# Patient Record
Sex: FEMALE | Race: BLACK OR AFRICAN AMERICAN | Employment: OTHER | URBAN - METROPOLITAN AREA
[De-identification: names, ages, dates, MRNs, and addresses within clinical notes are randomized per-mention and may not be internally consistent; named-entity substitution may affect disease eponyms.]

---

## 2022-04-10 ENCOUNTER — APPOINTMENT (OUTPATIENT)
Dept: CT IMAGING | Age: 87
DRG: 684 | End: 2022-04-10
Attending: EMERGENCY MEDICINE
Payer: MEDICARE

## 2022-04-10 ENCOUNTER — HOSPITAL ENCOUNTER (EMERGENCY)
Age: 87
Discharge: STILL ADMITTED OR ADMINISTRATIVE | DRG: 684 | End: 2022-04-10
Attending: EMERGENCY MEDICINE
Payer: MEDICARE

## 2022-04-10 ENCOUNTER — HOSPITAL ENCOUNTER (INPATIENT)
Age: 87
LOS: 1 days | Discharge: HOME | DRG: 684 | End: 2022-04-11
Attending: FAMILY MEDICINE | Admitting: FAMILY MEDICINE
Payer: MEDICARE

## 2022-04-10 VITALS
DIASTOLIC BLOOD PRESSURE: 63 MMHG | TEMPERATURE: 97.8 F | SYSTOLIC BLOOD PRESSURE: 136 MMHG | WEIGHT: 198.41 LBS | HEART RATE: 62 BPM | OXYGEN SATURATION: 98 % | BODY MASS INDEX: 33.06 KG/M2 | RESPIRATION RATE: 16 BRPM | HEIGHT: 65 IN

## 2022-04-10 DIAGNOSIS — R55 SYNCOPE, UNSPECIFIED SYNCOPE TYPE: Primary | ICD-10-CM

## 2022-04-10 PROBLEM — N17.9 AKI (ACUTE KIDNEY INJURY) (HCC): Status: ACTIVE | Noted: 2022-04-10

## 2022-04-10 PROBLEM — R19.7 DIARRHEA: Status: ACTIVE | Noted: 2022-04-10

## 2022-04-10 PROBLEM — R11.10 VOMITING: Status: ACTIVE | Noted: 2022-04-10

## 2022-04-10 LAB
ALBUMIN SERPL-MCNC: 3.8 G/DL (ref 3.2–4.6)
ALBUMIN/GLOB SERPL: 1.4 {RATIO}
ALP SERPL-CCNC: 66 U/L (ref 45–117)
ALT SERPL-CCNC: 9 U/L (ref 13–61)
ANION GAP SERPL CALC-SCNC: 12 MMOL/L (ref 7–16)
APPEARANCE UR: CLEAR
AST SERPL-CCNC: 22 U/L (ref 15–37)
BASOPHILS # BLD: 0 K/UL (ref 0–0.2)
BASOPHILS NFR BLD: 0 % (ref 0–2)
BILIRUB SERPL-MCNC: 0.2 MG/DL (ref 0.2–1.1)
BILIRUB UR QL: NEGATIVE
BUN SERPL-MCNC: 17 MG/DL (ref 7–18)
CALCIUM SERPL-MCNC: 8.7 MG/DL (ref 8.3–10.4)
CHLORIDE SERPL-SCNC: 103 MMOL/L (ref 98–107)
CO2 SERPL-SCNC: 23 MMOL/L (ref 21–32)
COLOR UR: YELLOW
CREAT SERPL-MCNC: 1.25 MG/DL (ref 0.6–1)
DIFFERENTIAL METHOD BLD: ABNORMAL
EOSINOPHIL # BLD: 0 K/UL (ref 0–0.8)
EOSINOPHIL NFR BLD: 0 % (ref 0.5–7.8)
ERYTHROCYTE [DISTWIDTH] IN BLOOD BY AUTOMATED COUNT: 14.5 % (ref 11.9–14.6)
GLOBULIN SER CALC-MCNC: 2.8 G/DL (ref 2.3–3.5)
GLUCOSE SERPL-MCNC: 129 MG/DL (ref 65–100)
GLUCOSE UR STRIP.AUTO-MCNC: NEGATIVE MG/DL
HCT VFR BLD AUTO: 36.6 % (ref 35.8–46.3)
HGB BLD-MCNC: 12 G/DL (ref 11.7–15.4)
HGB UR QL STRIP: NEGATIVE
IMM GRANULOCYTES # BLD AUTO: 0 K/UL (ref 0–0.5)
IMM GRANULOCYTES NFR BLD AUTO: 0 % (ref 0–5)
KETONES UR QL STRIP.AUTO: NEGATIVE MG/DL
LACTATE SERPL-SCNC: 1.1 MMOL/L (ref 0.4–2)
LEUKOCYTE ESTERASE UR QL STRIP.AUTO: NEGATIVE
LYMPHOCYTES # BLD: 1 K/UL (ref 0.5–4.6)
LYMPHOCYTES NFR BLD: 14 % (ref 13–44)
MAGNESIUM SERPL-MCNC: 1.8 MG/DL (ref 1.2–2.6)
MCH RBC QN AUTO: 28.6 PG (ref 26.1–32.9)
MCHC RBC AUTO-ENTMCNC: 32.8 G/DL (ref 31.4–35)
MCV RBC AUTO: 87.4 FL (ref 79.6–97.8)
MONOCYTES # BLD: 0.6 K/UL (ref 0.1–1.3)
MONOCYTES NFR BLD: 8 % (ref 4–12)
NEUTS SEG # BLD: 5.3 K/UL (ref 1.7–8.2)
NEUTS SEG NFR BLD: 77 % (ref 43–78)
NITRITE UR QL STRIP.AUTO: NEGATIVE
NRBC # BLD: 0 K/UL (ref 0–0.2)
PH UR STRIP: 5 [PH] (ref 5–9)
PLATELET # BLD AUTO: 162 K/UL (ref 150–450)
PMV BLD AUTO: 9.8 FL (ref 9.4–12.3)
POTASSIUM SERPL-SCNC: 4 MMOL/L (ref 3.5–5.1)
PROT SERPL-MCNC: 6.6 G/DL (ref 6.4–8.2)
PROT UR STRIP-MCNC: NEGATIVE MG/DL
RBC # BLD AUTO: 4.19 M/UL (ref 4.05–5.2)
SODIUM SERPL-SCNC: 138 MMOL/L (ref 136–145)
SP GR UR REFRACTOMETRY: 1.02 (ref 1–1.02)
TROPONIN T SERPL HS-MCNC: 12.1 NG/L (ref 0–14)
UROBILINOGEN UR QL STRIP.AUTO: 0.2 EU/DL (ref 0.2–1)
WBC # BLD AUTO: 6.9 K/UL (ref 4.3–11.1)

## 2022-04-10 PROCEDURE — 85025 COMPLETE CBC W/AUTO DIFF WBC: CPT

## 2022-04-10 PROCEDURE — 81003 URINALYSIS AUTO W/O SCOPE: CPT

## 2022-04-10 PROCEDURE — 93005 ELECTROCARDIOGRAM TRACING: CPT | Performed by: EMERGENCY MEDICINE

## 2022-04-10 PROCEDURE — 99285 EMERGENCY DEPT VISIT HI MDM: CPT

## 2022-04-10 PROCEDURE — 70450 CT HEAD/BRAIN W/O DYE: CPT

## 2022-04-10 PROCEDURE — 83605 ASSAY OF LACTIC ACID: CPT

## 2022-04-10 PROCEDURE — 80053 COMPREHEN METABOLIC PANEL: CPT

## 2022-04-10 PROCEDURE — 84484 ASSAY OF TROPONIN QUANT: CPT

## 2022-04-10 PROCEDURE — 65270000029 HC RM PRIVATE

## 2022-04-10 PROCEDURE — 83735 ASSAY OF MAGNESIUM: CPT

## 2022-04-10 PROCEDURE — 74011250636 HC RX REV CODE- 250/636: Performed by: FAMILY MEDICINE

## 2022-04-10 PROCEDURE — 74011000250 HC RX REV CODE- 250: Performed by: FAMILY MEDICINE

## 2022-04-10 RX ORDER — ACETAMINOPHEN 650 MG/1
650 SUPPOSITORY RECTAL
Status: DISCONTINUED | OUTPATIENT
Start: 2022-04-10 | End: 2022-04-11 | Stop reason: HOSPADM

## 2022-04-10 RX ORDER — SODIUM CHLORIDE 0.9 % (FLUSH) 0.9 %
5-40 SYRINGE (ML) INJECTION EVERY 8 HOURS
Status: DISCONTINUED | OUTPATIENT
Start: 2022-04-10 | End: 2022-04-10 | Stop reason: HOSPADM

## 2022-04-10 RX ORDER — SODIUM CHLORIDE 0.9 % (FLUSH) 0.9 %
5-40 SYRINGE (ML) INJECTION AS NEEDED
Status: DISCONTINUED | OUTPATIENT
Start: 2022-04-10 | End: 2022-04-11 | Stop reason: HOSPADM

## 2022-04-10 RX ORDER — ONDANSETRON 2 MG/ML
4 INJECTION INTRAMUSCULAR; INTRAVENOUS
Status: DISCONTINUED | OUTPATIENT
Start: 2022-04-10 | End: 2022-04-11 | Stop reason: HOSPADM

## 2022-04-10 RX ORDER — SODIUM CHLORIDE 0.9 % (FLUSH) 0.9 %
5-40 SYRINGE (ML) INJECTION AS NEEDED
Status: DISCONTINUED | OUTPATIENT
Start: 2022-04-10 | End: 2022-04-10 | Stop reason: HOSPADM

## 2022-04-10 RX ORDER — SODIUM CHLORIDE 9 MG/ML
100 INJECTION, SOLUTION INTRAVENOUS CONTINUOUS
Status: DISCONTINUED | OUTPATIENT
Start: 2022-04-10 | End: 2022-04-11

## 2022-04-10 RX ORDER — ACETAMINOPHEN 325 MG/1
650 TABLET ORAL
Status: DISCONTINUED | OUTPATIENT
Start: 2022-04-10 | End: 2022-04-11 | Stop reason: HOSPADM

## 2022-04-10 RX ORDER — SODIUM CHLORIDE 9 MG/ML
125 INJECTION, SOLUTION INTRAVENOUS CONTINUOUS
Status: DISCONTINUED | OUTPATIENT
Start: 2022-04-10 | End: 2022-04-10 | Stop reason: HOSPADM

## 2022-04-10 RX ORDER — SODIUM CHLORIDE 0.9 % (FLUSH) 0.9 %
5-40 SYRINGE (ML) INJECTION EVERY 8 HOURS
Status: DISCONTINUED | OUTPATIENT
Start: 2022-04-10 | End: 2022-04-11 | Stop reason: HOSPADM

## 2022-04-10 RX ORDER — ENOXAPARIN SODIUM 100 MG/ML
40 INJECTION SUBCUTANEOUS EVERY 24 HOURS
Status: DISCONTINUED | OUTPATIENT
Start: 2022-04-10 | End: 2022-04-11 | Stop reason: HOSPADM

## 2022-04-10 RX ORDER — POLYETHYLENE GLYCOL 3350 17 G/17G
17 POWDER, FOR SOLUTION ORAL DAILY PRN
Status: DISCONTINUED | OUTPATIENT
Start: 2022-04-10 | End: 2022-04-11 | Stop reason: HOSPADM

## 2022-04-10 RX ORDER — ONDANSETRON 4 MG/1
4 TABLET, ORALLY DISINTEGRATING ORAL
Status: DISCONTINUED | OUTPATIENT
Start: 2022-04-10 | End: 2022-04-11 | Stop reason: HOSPADM

## 2022-04-10 RX ADMIN — SODIUM CHLORIDE 100 ML/HR: 9 INJECTION, SOLUTION INTRAVENOUS at 20:54

## 2022-04-10 RX ADMIN — SODIUM CHLORIDE, PRESERVATIVE FREE 10 ML: 5 INJECTION INTRAVENOUS at 20:54

## 2022-04-10 RX ADMIN — ENOXAPARIN SODIUM 40 MG: 40 INJECTION SUBCUTANEOUS at 22:02

## 2022-04-10 NOTE — H&P
Hospitalist History and Physical   Admit Date:  4/10/2022  6:23 PM   Name:  Ronak Hart   Age:  82 y.o. Sex:  female  :  1935   MRN:  [id number]     Presenting Complaint: syncope  Reason(s) for Admission: syncope     History of Present Illness:   Ronak Hart is a 82 y.o. female who presented to ED with a reported syncopal event. Patient has been sent here from the [facility] ER. She is accompanied by her daughter and son-in-law who she is here visiting from [place]. Patient tells me she feels fine and has no complaints at all. Daughter provides history:  Patient reportedly had a brief syncopal episode at lunch. She awoke spontaneously. After awaking, she had an episode of vomiting and diarrhea. She was reportedly weak afterwards. EMS arrived at some point in time afterwards and help the patient to the bathroom again, where she had another episode of diarrhea. She was then brought into the ER. The patient herself does not recall passing out or the diarrhea episodes. Upon ER workup, head CT with WNL. UA is not indicative of infection. Labs are okay besides mild IRAIDA with Cr of 1.25. Hospitalist asked to admit. Review of Systems:  10 systems reviewed and negative except as noted in HPI. Assessment & Plan:   * Syncope  - Potentially related to the volume depletion, patient does have mild IRAIDA (no known or reported h/o CKD)  - Head CT and other labs okay  - IVF  - Cardiac monitor    IRAIDA (acute kidney injury) ([facility] [address]  - Cr slightly elevated at 1.25  - No prior to compare  - IVFs  - Recheck in AM    Diarrhea  - Monitor for additional episodes  - Obtain stool sample if recurrent  - IVFs    Vomiting  - Zofran prn       Diet: ADULT DIET Regular  VTE ppx: lovenox  Code status: Full Code      Past medical history reviewed. Past surgical history reviewed.      No Known Allergies   Social History     Tobacco Use    Smoking status: Not on file    Smokeless tobacco: Not on file   Substance Use Topics    Alcohol use: Not on file       Family history reviewed and noncontributory to patient's acute condition; no relevant family history unless otherwise noted above. There is no immunization history on file for this patient. PTA Medications:  No current outpatient medications    Objective:     Patient Vitals for the past 24 hrs:   Temp Pulse Resp BP SpO2   04/10/22 1947 98.4 °F (36.9 °C) (!) 58 18 119/67 99 %   04/10/22 1854 97.7 °F (36.5 °C) 60 16 (!) 147/84 100 %     Oxygen Therapy  O2 Sat (%): 99 % (04/10/22 1947)    Estimated body mass index is 33.02 kg/m² as calculated from the following:    Height as of an earlier encounter on 4/10/22: 5' 5\" (1.651 m). Weight as of an earlier encounter on 4/10/22: 90 kg (198 lb 6.6 oz). No intake or output data in the 24 hours ending 04/10/22 1958      Physical Exam:  General:    Well nourished. No overt distress  Head:  Normocephalic, atraumatic  Eyes:  Sclerae appear normal.  Pupils equally round. HENT:  Nares appear normal, no drainage. Moist mucous membranes  Neck:  No restricted ROM. Trachea midline  CV:   RRR. S1/S2 auscultated  Lungs:   CTAB. No wheezing, rhonchi, or rales. Appears even, unlabored  Abdomen: Bowel sounds present. Soft, nontender, nondistended. Extremities: Warm and dry. No cyanosis or clubbing. No edema. Skin:     No rashes. Normal turgor. Normal coloration  Neuro:  Cranial nerves II-XII grossly intact. Sensation intact  Psych:  Normal mood and affect.   Alert and oriented x2-3    Data Ordered and Personally Reviewed:    Last 24hr Labs:  Recent Results (from the past 24 hour(s))   CBC WITH AUTOMATED DIFF    Collection Time: 04/10/22  3:01 PM   Result Value Ref Range    WBC 6.9 4.3 - 11.1 K/uL    RBC 4.19 4.05 - 5.20 M/uL    HGB 12.0 11.7 - 15.4 g/dL    HCT 36.6 35.8 - 46.3 %    MCV 87.4 79.6 - 97.8 FL    MCH 28.6 26.1 - 32.9 PG    MCHC 32.8 31.4 - 35.0 g/dL    RDW 14.5 11.9 - 14.6 %    PLATELET 102 064 - 218 K/uL MPV 9.8 9.4 - 12.3 FL    ABSOLUTE NRBC 0.00 0.0 - 0.2 K/uL    DF AUTOMATED      NEUTROPHILS 77 43 - 78 %    LYMPHOCYTES 14 13 - 44 %    MONOCYTES 8 4.0 - 12.0 %    EOSINOPHILS 0 (L) 0.5 - 7.8 %    BASOPHILS 0 0.0 - 2.0 %    IMMATURE GRANULOCYTES 0 0.0 - 5.0 %    ABS. NEUTROPHILS 5.3 1.7 - 8.2 K/UL    ABS. LYMPHOCYTES 1.0 0.5 - 4.6 K/UL    ABS. MONOCYTES 0.6 0.1 - 1.3 K/UL    ABS. EOSINOPHILS 0.0 0.0 - 0.8 K/UL    ABS. BASOPHILS 0.0 0.0 - 0.2 K/UL    ABS. IMM. GRANS. 0.0 0.0 - 0.5 K/UL   METABOLIC PANEL, COMPREHENSIVE    Collection Time: 04/10/22  3:01 PM   Result Value Ref Range    Sodium 138 136 - 145 mmol/L    Potassium 4.0 3.5 - 5.1 mmol/L    Chloride 103 98 - 107 mmol/L    CO2 23 21 - 32 mmol/L    Anion gap 12 7.0 - 16.0 mmol/L    Glucose 129 (H) 65 - 100 mg/dL    BUN 17 7.0 - 18.0 MG/DL    Creatinine 1.25 (H) 0.6 - 1.0 MG/DL    GFR est AA 52 (L) >60 ml/min/1.73m2    GFR est non-AA 43 (L) >60 ml/min/1.73m2    Calcium 8.7 8.3 - 10.4 MG/DL    Bilirubin, total 0.2 0.2 - 1.1 MG/DL    ALT (SGPT) 9 (L) 13.0 - 61.0 U/L    AST (SGOT) 22 15 - 37 U/L    Alk.  phosphatase 66 45.0 - 117.0 U/L    Protein, total 6.6 6.4 - 8.2 g/dL    Albumin 3.8 3.2 - 4.6 g/dL    Globulin 2.8 2.3 - 3.5 g/dL    A-G Ratio 1.4     LACTIC ACID    Collection Time: 04/10/22  3:01 PM   Result Value Ref Range    Lactic acid 1.1 0.4 - 2.0 MMOL/L   MAGNESIUM    Collection Time: 04/10/22  3:01 PM   Result Value Ref Range    Magnesium 1.8 1.2 - 2.6 mg/dL   TROPONIN-T    Collection Time: 04/10/22  3:20 PM   Result Value Ref Range    Troponin-T, QT, High sensitivity 12.1 0 - 14 ng/L   URINALYSIS W/ RFLX MICROSCOPIC    Collection Time: 04/10/22  4:15 PM   Result Value Ref Range    Color YELLOW      Appearance CLEAR      Specific gravity 1.020 1.001 - 1.023      pH (UA) 5.0 5.0 - 9.0      Protein Negative NEG mg/dL    Glucose Negative mg/dL    Ketone Negative NEG mg/dL    Bilirubin Negative NEG      Blood Negative NEG      Urobilinogen 0.2 0.2 - 1.0 EU/dL Nitrites Negative NEG      Leukocyte Esterase Negative NEG         All Micro Results     None          Other Studies:  CT HEAD WO CONT    Result Date: 4/10/2022  NONCONTRAST HEAD CT CLINICAL HISTORY:  Nausea after near syncopal episode. TECHNIQUE:  Axial images were obtained with spiral technique. Radiation dose reduction was achieved using one or all of the following techniques: automated exposure control, weight-based dosing, iterative reconstruction. COMPARISON:  None. REPORT:   Standard noncontrast head CT demonstrates no definite intracranial mass effect, hemorrhage, or evidence of acute geographic infarction. The ventricles are normal in size and configuration, accounting for the patient's age. Orbits  and paranasal sinuses are clear where imaged. Bone windows demonstrate no definite fracture or destruction. NO ACUTE INTRACRANIAL ABNORMALITY OR CALVARIAL FRACTURE IDENTIFIED AT NONCONTRAST CT.                Signed:  rSinivas Mathias MD

## 2022-04-10 NOTE — ED NOTES
TRANSFER - OUT REPORT:    Verbal report given to Dorene Stapleton RN(name) on Jimmy Minus  being transferred to [facility](unit) for routine progression of care       Report consisted of patients Situation, Background, Assessment and   Recommendations(SBAR). Information from the following report(s) ED Summary was reviewed with the receiving nurse. Lines:   Peripheral IV 04/10/22 Left Hand (Active)   Site Assessment Clean, dry, & intact 04/10/22 1446   Phlebitis Assessment 0 04/10/22 1446   Infiltration Assessment 0 04/10/22 1446   Dressing Status Clean, dry, & intact 04/10/22 1446        Opportunity for questions and clarification was provided.       Patient transported with:   Monitor   [facility] TRANSPORT

## 2022-04-10 NOTE — ED TRIAGE NOTES
Patient brought from home by EMS via stretcher. Patient alert, oriented to self , date ,place and time. EMS states patient does have dementia. Patient is calm, cooperative, maddie NAD. resp even unlabored. Well groomed, dressed in patient gown , airway patent, speech clear. Daughter arrived and at bedside. Dr. Hi Shows assessed patient at bedside.

## 2022-04-10 NOTE — ED PROVIDER NOTES
61-year-old lady presents with EMS with concerns about diarrhea and weakness. Apparently she was eating lunch and then she suddenly started having some bouts of diarrhea. EMS reports that when they got there they helped the patient went to the bathroom where she had some more diarrhea. She says that she has no other symptoms including no nausea or vomiting. She denies any chest pain or headache. Patient is apparently visiting family from out of state. She reported no significant medical problems to EMS. Patient says she does not know exactly what happened. She said her memories of the events are little fuzzy and she wonders if she may have passed out. EMS that she did not pass out as long as they were there as they actually helped her get up and walked her to the bathroom. No other associated symptoms. Elements of this note were created using speech recognition software. As such, errors of speech recognition may be present. No past medical history on file. No past surgical history on file. No family history on file. Social History     Socioeconomic History    Marital status: Not on file     Spouse name: Not on file    Number of children: Not on file    Years of education: Not on file    Highest education level: Not on file   Occupational History    Not on file   Tobacco Use    Smoking status: Not on file    Smokeless tobacco: Not on file   Substance and Sexual Activity    Alcohol use: Not on file    Drug use: Not on file    Sexual activity: Not on file   Other Topics Concern    Not on file   Social History Narrative    Not on file     Social Determinants of Health     Financial Resource Strain:     Difficulty of Paying Living Expenses: Not on file   Food Insecurity:     Worried About Running Out of Food in the Last Year: Not on file    Elzbieta of Food in the Last Year: Not on file   Transportation Needs:     Lack of Transportation (Medical):  Not on file    Lack of Transportation (Non-Medical): Not on file   Physical Activity:     Days of Exercise per Week: Not on file    Minutes of Exercise per Session: Not on file   Stress:     Feeling of Stress : Not on file   Social Connections:     Frequency of Communication with Friends and Family: Not on file    Frequency of Social Gatherings with Friends and Family: Not on file    Attends [employer] Services: Not on file    Active Member of [address] [place] [address] or Organizations: Not on file    Attends Club or Organization Meetings: Not on file    Marital Status: Not on file   Intimate Partner Violence:     Fear of Current or Ex-Partner: Not on file    Emotionally Abused: Not on file    Physically Abused: Not on file    Sexually Abused: Not on file   Housing Stability:     Unable to Pay for Housing in the Last Year: Not on file    Number of [place] in the Last Year: Not on file    Unstable Housing in the Last Year: Not on file         ALLERGIES: Patient has no allergy information on record. Review of Systems   Constitutional: Negative for chills, diaphoresis and fever. HENT: Negative for congestion, rhinorrhea and sore throat. Eyes: Negative for redness and visual disturbance. Respiratory: Negative for cough, chest tightness, shortness of breath and wheezing. Cardiovascular: Negative for chest pain and palpitations. Gastrointestinal: Positive for diarrhea. Negative for abdominal pain, blood in stool, nausea and vomiting. Endocrine: Negative for polydipsia and polyuria. Genitourinary: Negative for dysuria and hematuria. Musculoskeletal: Negative for arthralgias, myalgias and neck stiffness. Skin: Negative for rash. Allergic/Immunologic: Negative for environmental allergies and food allergies. Neurological: Negative for dizziness, weakness and headaches. Hematological: Negative for adenopathy. Does not bruise/bleed easily. Psychiatric/Behavioral: Negative for confusion and sleep disturbance.  The patient is not nervous/anxious. There were no vitals filed for this visit. Physical Exam  Vitals and nursing note reviewed. Constitutional:       General: She is not in acute distress. Appearance: She is well-developed. She is not toxic-appearing. HENT:      Head: Normocephalic and atraumatic. Eyes:      General: No scleral icterus. Right eye: No discharge. Left eye: No discharge. Conjunctiva/sclera: Conjunctivae normal.      Pupils: Pupils are equal, round, and reactive to light. Cardiovascular:      Rate and Rhythm: Normal rate and regular rhythm. Heart sounds: Normal heart sounds. Pulmonary:      Effort: Pulmonary effort is normal. No respiratory distress. Breath sounds: Normal breath sounds. No wheezing or rales. Chest:      Chest wall: No tenderness. Abdominal:      General: Bowel sounds are normal. There is no distension. Palpations: Abdomen is soft. Tenderness: There is no guarding or rebound. Musculoskeletal:         General: No tenderness. Normal range of motion. Cervical back: Normal range of motion. No rigidity. Lymphadenopathy:      Cervical: No cervical adenopathy. Skin:     General: Skin is warm and dry. Neurological:      General: No focal deficit present. Mental Status: She is alert and oriented to person, place, and time. Psychiatric:         Mood and Affect: Mood normal.         Behavior: Behavior normal.          MDM  Number of Diagnoses or Management Options  Diagnosis management comments: I am not entirely sure if the patient had a near syncopal event or just some early stomach bug symptoms. She has had no fevers or chills and no cough. She had no nausea or vomiting. It is possible that she had a vagal episode from nearly passing out which then caused her to lose her bowels. There was no apparent seizure activity.   Therefore, I will get a CT scan of her head, check a troponin, and EKG, and basic blood work. ED Course as of 04/10/22 1712   Sun Apr 10, 2022   1600 Patient's BUN and creatinine appear to be in line with what she has had to the [facility] system over the last several months. [AC]   1627 Patient's urine, blood work, and head CT are unremarkable. I do not have an explanation for her symptoms.   I have offered them transfer and admission for further observation. [AC]      ED Course User Index  [AC] Hailey García MD       Procedures

## 2022-04-10 NOTE — ED NOTES
[address] dispatch called for transfer to [facility] room 350. Report called to [facility] 3rd floor telemetry. Report given to [facility].

## 2022-04-10 NOTE — ACP (ADVANCE CARE PLANNING)
[facility] Hospitalist Service  At the heart of better care     Advance Care Planning   Admit Date:  4/10/2022  6:23 PM   Name:  Memo Love   Age:  82 y.o. Sex:  female  :  1935   MRN:  [id number]   Room:  [facility]/    Memo Love is able to make her own decisions: Yes    If pt unable to make decisions, POA/surrogate decision maker:  Daughter    Other members present in the meeting:   Daughter, Son-in-law    Patient / surrogate decision-maker directed:  Code Status: FULL CODE -full aggressive medical and surgical interventions, including intubations, resuscitations, pressors, artificial tube feeding    Patient or surrogate consented to discussion of the current conditions, workup, management plans, prognosis, and understand the risk for further deterioration. Time spent: 17 minutes in direct discussion (face to face and/or over phone).     Signed:  Nemo Puga MD

## 2022-04-11 VITALS
HEART RATE: 62 BPM | RESPIRATION RATE: 16 BRPM | OXYGEN SATURATION: 99 % | SYSTOLIC BLOOD PRESSURE: 138 MMHG | TEMPERATURE: 97.6 F | DIASTOLIC BLOOD PRESSURE: 68 MMHG

## 2022-04-11 PROBLEM — N18.31 ACUTE RENAL FAILURE WITH ACUTE TUBULAR NECROSIS SUPERIMPOSED ON STAGE 3A CHRONIC KIDNEY DISEASE (HCC): Status: ACTIVE | Noted: 2022-04-10

## 2022-04-11 PROBLEM — E66.09 CLASS 1 OBESITY DUE TO EXCESS CALORIES WITH SERIOUS COMORBIDITY AND BODY MASS INDEX (BMI) OF 33.0 TO 33.9 IN ADULT: Status: ACTIVE | Noted: 2020-01-07

## 2022-04-11 PROBLEM — D69.6 THROMBOCYTOPENIA (HCC): Status: ACTIVE | Noted: 2022-04-11

## 2022-04-11 PROBLEM — N18.31 ACUTE RENAL FAILURE WITH ACUTE TUBULAR NECROSIS SUPERIMPOSED ON STAGE 3A CHRONIC KIDNEY DISEASE (HCC): Status: ACTIVE | Noted: 2022-04-11

## 2022-04-11 PROBLEM — R19.7 DIARRHEA: Status: RESOLVED | Noted: 2022-04-10 | Resolved: 2022-04-11

## 2022-04-11 PROBLEM — N18.31 ACUTE RENAL FAILURE WITH ACUTE TUBULAR NECROSIS SUPERIMPOSED ON STAGE 3A CHRONIC KIDNEY DISEASE (HCC): Status: RESOLVED | Noted: 2022-04-10 | Resolved: 2022-04-11

## 2022-04-11 PROBLEM — R55 SYNCOPE: Status: RESOLVED | Noted: 2022-04-10 | Resolved: 2022-04-11

## 2022-04-11 PROBLEM — M54.50 LOW BACK PAIN, UNSPECIFIED: Status: ACTIVE | Noted: 2017-05-25

## 2022-04-11 PROBLEM — N17.0 ACUTE RENAL FAILURE WITH ACUTE TUBULAR NECROSIS SUPERIMPOSED ON STAGE 3A CHRONIC KIDNEY DISEASE (HCC): Status: ACTIVE | Noted: 2022-04-10

## 2022-04-11 PROBLEM — N17.0 ACUTE RENAL FAILURE WITH ACUTE TUBULAR NECROSIS SUPERIMPOSED ON STAGE 3A CHRONIC KIDNEY DISEASE (HCC): Status: ACTIVE | Noted: 2022-04-11

## 2022-04-11 PROBLEM — E66.01 MORBID (SEVERE) OBESITY DUE TO EXCESS CALORIES (HCC): Status: ACTIVE | Noted: 2020-01-07

## 2022-04-11 PROBLEM — R11.10 VOMITING: Status: RESOLVED | Noted: 2022-04-10 | Resolved: 2022-04-11

## 2022-04-11 PROBLEM — E66.09 CLASS 1 OBESITY DUE TO EXCESS CALORIES WITH SERIOUS COMORBIDITY AND BODY MASS INDEX (BMI) OF 33.0 TO 33.9 IN ADULT: Chronic | Status: ACTIVE | Noted: 2020-01-07

## 2022-04-11 PROBLEM — N17.0 ACUTE RENAL FAILURE WITH ACUTE TUBULAR NECROSIS SUPERIMPOSED ON STAGE 3A CHRONIC KIDNEY DISEASE (HCC): Status: RESOLVED | Noted: 2022-04-10 | Resolved: 2022-04-11

## 2022-04-11 LAB
ANION GAP SERPL CALC-SCNC: 5 MMOL/L (ref 7–16)
BASOPHILS # BLD: 0 K/UL (ref 0–0.2)
BASOPHILS NFR BLD: 0 % (ref 0–2)
BNP SERPL-MCNC: 376 PG/ML
BUN SERPL-MCNC: 13 MG/DL (ref 8–23)
CALCIUM SERPL-MCNC: 8.1 MG/DL (ref 8.3–10.4)
CHLORIDE SERPL-SCNC: 108 MMOL/L (ref 98–107)
CO2 SERPL-SCNC: 26 MMOL/L (ref 21–32)
CREAT SERPL-MCNC: 1 MG/DL (ref 0.6–1)
CRP SERPL-MCNC: 2.9 MG/DL (ref 0–0.9)
DIFFERENTIAL METHOD BLD: ABNORMAL
EOSINOPHIL # BLD: 0 K/UL (ref 0–0.8)
EOSINOPHIL NFR BLD: 0 % (ref 0.5–7.8)
ERYTHROCYTE [DISTWIDTH] IN BLOOD BY AUTOMATED COUNT: 14.4 % (ref 11.9–14.6)
EST. AVERAGE GLUCOSE BLD GHB EST-MCNC: 114 MG/DL
FERRITIN SERPL-MCNC: 56 NG/ML (ref 8–388)
FOLATE SERPL-MCNC: 8.6 NG/ML (ref 3.1–17.5)
GLUCOSE SERPL-MCNC: 93 MG/DL (ref 65–100)
HBA1C MFR BLD: 5.6 % (ref 4.2–6.3)
HCT VFR BLD AUTO: 31.3 % (ref 35.8–46.3)
HGB BLD-MCNC: 10.6 G/DL (ref 11.7–15.4)
IMM GRANULOCYTES # BLD AUTO: 0 K/UL (ref 0–0.5)
IMM GRANULOCYTES NFR BLD AUTO: 0 % (ref 0–5)
IRON SATN MFR SERPL: 7 %
IRON SERPL-MCNC: 15 UG/DL (ref 35–150)
LDH SERPL L TO P-CCNC: 144 U/L (ref 110–210)
LYMPHOCYTES # BLD: 1.6 K/UL (ref 0.5–4.6)
LYMPHOCYTES NFR BLD: 41 % (ref 13–44)
MCH RBC QN AUTO: 28.7 PG (ref 26.1–32.9)
MCHC RBC AUTO-ENTMCNC: 33.9 G/DL (ref 31.4–35)
MCV RBC AUTO: 84.8 FL (ref 79.6–97.8)
MONOCYTES # BLD: 0.5 K/UL (ref 0.1–1.3)
MONOCYTES NFR BLD: 13 % (ref 4–12)
NEUTS SEG # BLD: 1.7 K/UL (ref 1.7–8.2)
NEUTS SEG NFR BLD: 45 % (ref 43–78)
NRBC # BLD: 0 K/UL (ref 0–0.2)
PLATELET # BLD AUTO: 149 K/UL (ref 150–450)
PMV BLD AUTO: 9.8 FL (ref 9.4–12.3)
POTASSIUM SERPL-SCNC: 3.9 MMOL/L (ref 3.5–5.1)
RBC # BLD AUTO: 3.69 M/UL (ref 4.05–5.2)
SODIUM SERPL-SCNC: 139 MMOL/L (ref 136–145)
TIBC SERPL-MCNC: 219 UG/DL (ref 250–450)
TROPONIN-HIGH SENSITIVITY: 16.6 PG/ML (ref 0–14)
TSH SERPL DL<=0.005 MIU/L-ACNC: 0.33 UIU/ML
VIT B12 SERPL-MCNC: 304 PG/ML (ref 193–986)
WBC # BLD AUTO: 3.8 K/UL (ref 4.3–11.1)

## 2022-04-11 PROCEDURE — 83615 LACTATE (LD) (LDH) ENZYME: CPT

## 2022-04-11 PROCEDURE — 84443 ASSAY THYROID STIM HORMONE: CPT

## 2022-04-11 PROCEDURE — 83880 ASSAY OF NATRIURETIC PEPTIDE: CPT

## 2022-04-11 PROCEDURE — 83540 ASSAY OF IRON: CPT

## 2022-04-11 PROCEDURE — 82746 ASSAY OF FOLIC ACID SERUM: CPT

## 2022-04-11 PROCEDURE — 80048 BASIC METABOLIC PNL TOTAL CA: CPT

## 2022-04-11 PROCEDURE — 82607 VITAMIN B-12: CPT

## 2022-04-11 PROCEDURE — 82728 ASSAY OF FERRITIN: CPT

## 2022-04-11 PROCEDURE — 84484 ASSAY OF TROPONIN QUANT: CPT

## 2022-04-11 PROCEDURE — 85025 COMPLETE CBC W/AUTO DIFF WBC: CPT

## 2022-04-11 PROCEDURE — 74011000250 HC RX REV CODE- 250: Performed by: FAMILY MEDICINE

## 2022-04-11 PROCEDURE — 36415 COLL VENOUS BLD VENIPUNCTURE: CPT

## 2022-04-11 PROCEDURE — 83036 HEMOGLOBIN GLYCOSYLATED A1C: CPT

## 2022-04-11 PROCEDURE — 86140 C-REACTIVE PROTEIN: CPT

## 2022-04-11 RX ADMIN — SODIUM CHLORIDE, PRESERVATIVE FREE 5 ML: 5 INJECTION INTRAVENOUS at 05:21

## 2022-04-11 NOTE — PROGRESS NOTES
04/10/22 1950   Dual Skin Pressure Injury Assessment   Dual Skin Pressure Injury Assessment WDL   Second Care Provider (Based on [address] [place] [address]) Yandy Leonardo RN   Skin Integumentary   Skin Integumentary (WDL) WDL    Pressure  Injury Documentation No Pressure Injury Noted-Pressure Ulcer Prevention Initiated   Wound Prevention and Protection Methods   Orientation of Wound Prevention Posterior   Location of Wound Prevention Sacrum/Coccyx   Dressing Present  No   Wound Offloading (Prevention Methods) Bed, pressure reduction mattress

## 2022-04-11 NOTE — PROGRESS NOTES
CHITRA met with patient. Patient visiting with her daughter from [place]. Patient's  recently passed away. Patient is independent at home and ambulates without assistance. Patient discussed during rounds, anticipate home with family assistance today. CHITRA rounded with MD on patient. ACP completed by provider at admission.     Erich Severin, LMSW    [facility]    * [email or web address]

## 2022-04-11 NOTE — PROGRESS NOTES
SW reviewed patient's chart and conducted a baseline assessment. Discharge plan at this time is as follows:     Care Management Interventions  PCP Verified by CM: Yes  Mode of Transport at Discharge: Self  Transition of Care Consult (CM Consult): Discharge Planning  Support Systems: Child(javier)  Confirm Follow Up Transport: Family  Discharge Location  Patient Expects to be Discharged to[de-identified] Home with family assistance      *Please note that discharge plans can change throughout an inpatient admission.  Ensure that you are referring to the most recent social work/nurse case management note for current discharge plan*     Juju Mirza, [address] [place] [address] Work   [facility]    * [email or web address]

## 2022-04-11 NOTE — PROGRESS NOTES
Problem: Falls - Risk of  Goal: *Absence of Falls  Description: Document Afsaneh Lai Fall Risk and appropriate interventions in the flowsheet.   Outcome: Resolved/Met     Problem: Patient Education: Go to Patient Education Activity  Goal: Patient/Family Education  Outcome: Resolved/Met

## 2022-04-11 NOTE — DISCHARGE SUMMARY
Hospitalist Discharge Summary   Admit Date:  4/10/2022  6:23 PM   DC Note date: 2022  Name:  Shivam Grande   Age:  82 y.o. Sex:  female  :  1935   MRN:  [id number]   Room:  [facility]  PCP:  Agustín Diaz, Not On File, [facility]    Presenting Complaint: No chief complaint on file. Initial Admission Diagnosis: Acute renal failure with acute tubular necrosis superimposed on stage 3a chronic kidney disease ([facility] [N17.0, N18.31]     Problem List for this Hospitalization:  Hospital Problems as of 2022 Date Reviewed: 2022          Codes Class Noted - Resolved POA    * (Principal) RESOLVED: Syncope ICD-10-CM: R55  ICD-9-CM: 780.2  4/10/2022 - 2022 Yes        RESOLVED: Acute renal failure with acute tubular necrosis superimposed on stage 3a chronic kidney disease ([facility] ICD-10-CM: N17.0, N18.31  ICD-9-CM: 584.5, 585.3  4/10/2022 - 2022 Yes        Thrombocytopenia (HCC) ICD-10-CM: D69.6  ICD-9-CM: 287.5  2022 - Present No        Class 1 obesity due to excess calories with serious comorbidity and body mass index (BMI) of 33.0 to 33.9 in adult (Chronic) ICD-10-CM: E66.09, P95.42  ICD-9-CM: 278.00, V85.33  2020 - Present Yes        Stage 3a chronic kidney disease ([facility] ICD-10-CM: N18.31  ICD-9-CM: 585.3  2022 - Present Yes        RESOLVED: Vomiting ICD-10-CM: R11.10  ICD-9-CM: 787.03  4/10/2022 - 2022 Yes        RESOLVED: Diarrhea ICD-10-CM: R19.7  ICD-9-CM: 787.91  4/10/2022 - 2022 Yes            Did Patient have Sepsis (YES OR NO): No    Hospital Course:  87F PMHx CKD3a who presented to ED 4/10 with a reported syncopal event. Patient had been sent here from [facility] ER. She was accompanied by her daughter and son-in-law who she was here visiting from [place]. Patient reported she felt fine and had no complaints at all. Daughter provided history:  Patient reportedly had a brief syncopal episode at lunch. She awoke spontaneously.   After awaking, she had an episode of vomiting. She was reportedly weak afterwards. EMS arrived at some point and helped the patient to the bathroom, where she had an episode of diarrhea x2. She was then brought into the ER. The patient herself does not recall passing out or the diarrhea episodes. Upon ER workup, head CT with WNL. UA is not indicative of infection. Labs are okay besides mild IRAIDA with Cr of 1.25. Hospitalist asked to admit. Initially her medical records were not available. She remained stable overnight and asymptomatic. Work-up remained grossly unremarkable with mild elevation of troponin, mild anemia, mild thrombocytopenia. Discussed return precautions as she really wanted to leave to go shopping for hats. She was determined to be safe for discharge on 4/11 to return with any change in symptoms. She should follow-up with her primary care provider within 1 week. Primary care provider may consider the following:  -Weight loss after death of , encourage proper nutrition  -Insufficient water consumption to avoid urination places kidneys at risk  -Evaluation of chronic kidney disease stage IIIa  -Maintenance of muscle strength to preserve function in old age  -Did not rule out all potential cardiac causes, echo may be appropriate    Disposition: Home or Self Care  Diet: ADULT DIET Regular  Code Status: Full Code    Follow Up Orders: Follow-up Appointments   Procedures    FOLLOW UP VISIT Appointment in: One Week Follow up with PCP within one week. Follow up with PCP within one week. Standing Status:   Standing     Number of Occurrences:   1     Order Specific Question:   Appointment in     Answer: One Week       Follow-up Information     Follow up With Specialties Details Why Contact Info    [facility], Not On File, [facility]   [address]  [phone]          Time spent in patient discharge and coordination 33 minutes.     Plan was discussed with patient, family, nurse, case manager. All questions answered. Patient was stable at time of discharge. Instructions given to call a physician or return if any concerns. Discharge Info: There are no discharge medications for this patient. Procedures done this admission:  * No surgery found *    Consults this admission:  None    Echocardiogram/EKG results:  No results found for this or any previous visit. EKG Results     Procedure 720 Value Units Date/Time    EKG, 12 LEAD, SUBSEQUENT [[id number]]     Order Status: Sent           Diagnostic Imaging/Tests:   CT HEAD WO CONT    Result Date: 4/10/2022  NONCONTRAST HEAD CT CLINICAL HISTORY:  Nausea after near syncopal episode. TECHNIQUE:  Axial images were obtained with spiral technique. Radiation dose reduction was achieved using one or all of the following techniques: automated exposure control, weight-based dosing, iterative reconstruction. COMPARISON:  None. REPORT:   Standard noncontrast head CT demonstrates no definite intracranial mass effect, hemorrhage, or evidence of acute geographic infarction. The ventricles are normal in size and configuration, accounting for the patient's age. Orbits  and paranasal sinuses are clear where imaged. Bone windows demonstrate no definite fracture or destruction. NO ACUTE INTRACRANIAL ABNORMALITY OR CALVARIAL FRACTURE IDENTIFIED AT NONCONTRAST CT.        All Micro Results     None          Labs: Results:       BMP, Mg, Phos Recent Labs     04/11/22  0553 04/10/22  1501    138   K 3.9 4.0   * 103   CO2 26 23   AGAP 5* 12   BUN 13 17   CREA 1.00 1.25*   CA 8.1* 8.7   GLU 93 129*   MG  --  1.8      CBC Recent Labs     04/11/22  0553 04/10/22  1501   WBC 3.8* 6.9   RBC 3.69* 4.19   HGB 10.6* 12.0   HCT 31.3* 36.6   * 162   GRANS 45 77   LYMPH 41 14   EOS 0* 0*   MONOS 13* 8   BASOS 0 0   IG 0 0   ANEU 1.7 5.3   ABL 1.6 1.0   JUSTO 0.0 0.0   ABM 0.5 0.6   ABB 0.0 0.0   AIG 0.0 0.0      LFT Recent Labs     04/10/22  1501 ALT 9*   AP 66   TP 6.6   ALB 3.8   GLOB 2.8   AGRAT 1.4      Cardiac Testing No results found for: BNPP, BNP, CPK, RCK1, RCK2, RCK3, RCK4, CKMB, CKNDX, CKND1, TROPT, TROIQ   Coagulation Tests No results found for: PTP, INR, APTT, INREXT   A1c Lab Results   Component Value Date/Time    Hemoglobin A1c 5.6 04/11/2022 05:53 AM      Lipid Panel No results found for: CHOL, CHOLPOCT, CHOLX, CHLST, CHOLV, 883978, HDL, HDLP, LDL, LDLC, DLDLP, 127363, VLDLC, VLDL, TGLX, TRIGL, TRIGP, TGLPOCT, CHHD, [facility]   Thyroid Panel Lab Results   Component Value Date/Time    TSH 0.331 04/11/2022 05:53 AM        Most Recent UA Lab Results   Component Value Date/Time    Color YELLOW 04/10/2022 04:15 PM    Appearance CLEAR 04/10/2022 04:15 PM    pH (UA) 5.0 04/10/2022 04:15 PM    Protein Negative 04/10/2022 04:15 PM    Glucose Negative 04/10/2022 04:15 PM    Ketone Negative 04/10/2022 04:15 PM    Bilirubin Negative 04/10/2022 04:15 PM    Blood Negative 04/10/2022 04:15 PM    Urobilinogen 0.2 04/10/2022 04:15 PM    Nitrites Negative 04/10/2022 04:15 PM    Leukocyte Esterase Negative 04/10/2022 04:15 PM          All Labs from Last 24 Hrs:  Recent Results (from the past 24 hour(s))   CBC WITH AUTOMATED DIFF    Collection Time: 04/10/22  3:01 PM   Result Value Ref Range    WBC 6.9 4.3 - 11.1 K/uL    RBC 4.19 4.05 - 5.20 M/uL    HGB 12.0 11.7 - 15.4 g/dL    HCT 36.6 35.8 - 46.3 %    MCV 87.4 79.6 - 97.8 FL    MCH 28.6 26.1 - 32.9 PG    MCHC 32.8 31.4 - 35.0 g/dL    RDW 14.5 11.9 - 14.6 %    PLATELET 686 035 - 763 K/uL    MPV 9.8 9.4 - 12.3 FL    ABSOLUTE NRBC 0.00 0.0 - 0.2 K/uL    DF AUTOMATED      NEUTROPHILS 77 43 - 78 %    LYMPHOCYTES 14 13 - 44 %    MONOCYTES 8 4.0 - 12.0 %    EOSINOPHILS 0 (L) 0.5 - 7.8 %    BASOPHILS 0 0.0 - 2.0 %    IMMATURE GRANULOCYTES 0 0.0 - 5.0 %    ABS. NEUTROPHILS 5.3 1.7 - 8.2 K/UL    ABS. LYMPHOCYTES 1.0 0.5 - 4.6 K/UL    ABS. MONOCYTES 0.6 0.1 - 1.3 K/UL    ABS. EOSINOPHILS 0.0 0.0 - 0.8 K/UL    ABS. BASOPHILS 0.0 0.0 - 0.2 K/UL    ABS. IMM. GRANS. 0.0 0.0 - 0.5 K/UL   METABOLIC PANEL, COMPREHENSIVE    Collection Time: 04/10/22  3:01 PM   Result Value Ref Range    Sodium 138 136 - 145 mmol/L    Potassium 4.0 3.5 - 5.1 mmol/L    Chloride 103 98 - 107 mmol/L    CO2 23 21 - 32 mmol/L    Anion gap 12 7.0 - 16.0 mmol/L    Glucose 129 (H) 65 - 100 mg/dL    BUN 17 7.0 - 18.0 MG/DL    Creatinine 1.25 (H) 0.6 - 1.0 MG/DL    GFR est AA 52 (L) >60 ml/min/1.73m2    GFR est non-AA 43 (L) >60 ml/min/1.73m2    Calcium 8.7 8.3 - 10.4 MG/DL    Bilirubin, total 0.2 0.2 - 1.1 MG/DL    ALT (SGPT) 9 (L) 13.0 - 61.0 U/L    AST (SGOT) 22 15 - 37 U/L    Alk.  phosphatase 66 45.0 - 117.0 U/L    Protein, total 6.6 6.4 - 8.2 g/dL    Albumin 3.8 3.2 - 4.6 g/dL    Globulin 2.8 2.3 - 3.5 g/dL    A-G Ratio 1.4     LACTIC ACID    Collection Time: 04/10/22  3:01 PM   Result Value Ref Range    Lactic acid 1.1 0.4 - 2.0 MMOL/L   MAGNESIUM    Collection Time: 04/10/22  3:01 PM   Result Value Ref Range    Magnesium 1.8 1.2 - 2.6 mg/dL   TROPONIN-T    Collection Time: 04/10/22  3:20 PM   Result Value Ref Range    Troponin-T, QT, High sensitivity 12.1 0 - 14 ng/L   URINALYSIS W/ RFLX MICROSCOPIC    Collection Time: 04/10/22  4:15 PM   Result Value Ref Range    Color YELLOW      Appearance CLEAR      Specific gravity 1.020 1.001 - 1.023      pH (UA) 5.0 5.0 - 9.0      Protein Negative NEG mg/dL    Glucose Negative mg/dL    Ketone Negative NEG mg/dL    Bilirubin Negative NEG      Blood Negative NEG      Urobilinogen 0.2 0.2 - 1.0 EU/dL    Nitrites Negative NEG      Leukocyte Esterase Negative NEG     TROPONIN-HIGH SENSITIVITY    Collection Time: 04/11/22  5:46 AM   Result Value Ref Range    Troponin-High Sensitivity 16.6 (H) 0 - 14 pg/mL   C REACTIVE PROTEIN, QT    Collection Time: 04/11/22  5:46 AM   Result Value Ref Range    C-Reactive protein 2.9 (H) 0.0 - 0.9 mg/dL   METABOLIC PANEL, BASIC    Collection Time: 04/11/22  5:53 AM   Result Value Ref Range    Sodium 139 136 - 145 mmol/L    Potassium 3.9 3.5 - 5.1 mmol/L    Chloride 108 (H) 98 - 107 mmol/L    CO2 26 21 - 32 mmol/L    Anion gap 5 (L) 7 - 16 mmol/L    Glucose 93 65 - 100 mg/dL    BUN 13 8 - 23 MG/DL    Creatinine 1.00 0.6 - 1.0 MG/DL    GFR est AA >60 >60 ml/min/1.73m2    GFR est non-AA 56 (L) >60 ml/min/1.73m2    Calcium 8.1 (L) 8.3 - 10.4 MG/DL   CBC WITH AUTOMATED DIFF    Collection Time: 04/11/22  5:53 AM   Result Value Ref Range    WBC 3.8 (L) 4.3 - 11.1 K/uL    RBC 3.69 (L) 4.05 - 5.2 M/uL    HGB 10.6 (L) 11.7 - 15.4 g/dL    HCT 31.3 (L) 35.8 - 46.3 %    MCV 84.8 79.6 - 97.8 FL    MCH 28.7 26.1 - 32.9 PG    MCHC 33.9 31.4 - 35.0 g/dL    RDW 14.4 11.9 - 14.6 %    PLATELET 059 (L) 494 - 450 K/uL    MPV 9.8 9.4 - 12.3 FL    ABSOLUTE NRBC 0.00 0.0 - 0.2 K/uL    DF AUTOMATED      NEUTROPHILS 45 43 - 78 %    LYMPHOCYTES 41 13 - 44 %    MONOCYTES 13 (H) 4.0 - 12.0 %    EOSINOPHILS 0 (L) 0.5 - 7.8 %    BASOPHILS 0 0.0 - 2.0 %    IMMATURE GRANULOCYTES 0 0.0 - 5.0 %    ABS. NEUTROPHILS 1.7 1.7 - 8.2 K/UL    ABS. LYMPHOCYTES 1.6 0.5 - 4.6 K/UL    ABS. MONOCYTES 0.5 0.1 - 1.3 K/UL    ABS. EOSINOPHILS 0.0 0.0 - 0.8 K/UL    ABS. BASOPHILS 0.0 0.0 - 0.2 K/UL    ABS. IMM.  GRANS. 0.0 0.0 - 0.5 K/UL   FERRITIN    Collection Time: 04/11/22  5:53 AM   Result Value Ref Range    Ferritin 56 8 - 388 NG/ML   LD    Collection Time: 04/11/22  5:53 AM   Result Value Ref Range     110 - 210 U/L   TRANSFERRIN SATURATION    Collection Time: 04/11/22  5:53 AM   Result Value Ref Range    Iron 15 (L) 35 - 150 ug/dL    TIBC 219 (L) 250 - 450 ug/dL    Transferrin Saturation 7 %   TSH 3RD GENERATION    Collection Time: 04/11/22  5:53 AM   Result Value Ref Range    TSH 0.331 uIU/mL   HEMOGLOBIN A1C WITH EAG    Collection Time: 04/11/22  5:53 AM   Result Value Ref Range    Hemoglobin A1c 5.6 4.20 - 6.30 %    Est. average glucose 114 mg/dL   NT-PRO BNP    Collection Time: 04/11/22  5:53 AM   Result Value Ref Range    NT pro- <450 PG/ML       Current Med List in Hospital:   Current Facility-Administered Medications   Medication Dose Route Frequency    sodium chloride (NS) flush 5-40 mL  5-40 mL IntraVENous Q8H    sodium chloride (NS) flush 5-40 mL  5-40 mL IntraVENous PRN    acetaminophen (TYLENOL) tablet 650 mg  650 mg Oral Q6H PRN    Or    acetaminophen (TYLENOL) suppository 650 mg  650 mg Rectal Q6H PRN    polyethylene glycol (MIRALAX) packet 17 g  17 g Oral DAILY PRN    ondansetron (ZOFRAN ODT) tablet 4 mg  4 mg Oral Q8H PRN    Or    ondansetron (ZOFRAN) injection 4 mg  4 mg IntraVENous Q6H PRN    enoxaparin (LOVENOX) injection 40 mg  40 mg SubCUTAneous Q24H       Allergies   Allergen Reactions    Cephalexin Angioedema       There is no immunization history on file for this patient. Recent Vital Data:  Patient Vitals for the past 24 hrs:   Temp Pulse Resp BP SpO2   04/11/22 0725 99.5 °F (37.5 °C) (!) 57 16 130/64 96 %   04/11/22 0328 98.8 °F (37.1 °C) 60 16 (!) 149/53 97 %   04/10/22 2346 98.6 °F (37 °C) 61 16 (!) 153/61 95 %   04/10/22 1947 98.4 °F (36.9 °C) (!) 58 18 119/67 99 %   04/10/22 1854 97.7 °F (36.5 °C) 60 16 (!) 147/84 100 %     Oxygen Therapy  O2 Sat (%): 96 % (04/11/22 0725)  O2 Device: None (Room air) (04/11/22 0402)    Estimated body mass index is 33.02 kg/m² as calculated from the following:    Height as of an earlier encounter on 4/10/22: 5' 5\" (1.651 m). Weight as of an earlier encounter on 4/10/22: 90 kg (198 lb 6.6 oz). Intake/Output Summary (Last 24 hours) at 4/11/2022 1057  Last data filed at 4/11/2022 0648  Gross per 24 hour   Intake 1305.5 ml   Output --   Net 1305.5 ml       Physical Exam  Vitals and nursing note reviewed. Constitutional:       General: She is not in acute distress. Appearance: Normal appearance. She is obese. She is not ill-appearing. HENT:      Head: Normocephalic. Eyes:      Extraocular Movements: Extraocular movements intact.    Cardiovascular: Rate and Rhythm: Normal rate and regular rhythm. Pulses:           Radial pulses are 2+ on the left side. Pulmonary:      Effort: Pulmonary effort is normal. No respiratory distress. Abdominal:      Tenderness: There is no abdominal tenderness. Musculoskeletal:         General: Swelling (mild) present. No deformity. Cervical back: No rigidity. Right lower leg: No edema. Left lower leg: No edema. Skin:     General: Skin is warm and dry. Neurological:      General: No focal deficit present. Mental Status: She is alert and oriented to person, place, and time.    Psychiatric:         Mood and Affect: Mood normal.         Behavior: Behavior normal.      Comments: Sebastian         Signed:  Eloisa Holley MD